# Patient Record
Sex: MALE | Race: OTHER | NOT HISPANIC OR LATINO
[De-identification: names, ages, dates, MRNs, and addresses within clinical notes are randomized per-mention and may not be internally consistent; named-entity substitution may affect disease eponyms.]

---

## 2017-06-28 ENCOUNTER — APPOINTMENT (OUTPATIENT)
Dept: OPHTHALMOLOGY | Facility: CLINIC | Age: 57
End: 2017-06-28

## 2017-06-28 DIAGNOSIS — H50.15 ALTERNATING EXOTROPIA: ICD-10-CM

## 2017-06-29 PROBLEM — H50.15 EXOTROPIA, ALTERNATING: Status: ACTIVE | Noted: 2017-06-29

## 2017-08-21 ENCOUNTER — APPOINTMENT (OUTPATIENT)
Dept: OPHTHALMOLOGY | Facility: CLINIC | Age: 57
End: 2017-08-21

## 2020-04-26 ENCOUNTER — MESSAGE (OUTPATIENT)
Age: 60
End: 2020-04-26

## 2020-06-09 ENCOUNTER — APPOINTMENT (OUTPATIENT)
Dept: HUMAN REPRODUCTION | Facility: CLINIC | Age: 60
End: 2020-06-09

## 2023-02-13 DIAGNOSIS — Z11.52 ENCOUNTER FOR SCREENING FOR COVID-19: ICD-10-CM

## 2023-02-13 PROBLEM — Z00.00 ENCOUNTER FOR PREVENTIVE HEALTH EXAMINATION: Status: ACTIVE | Noted: 2023-02-13

## 2023-02-14 ENCOUNTER — TRANSCRIPTION ENCOUNTER (OUTPATIENT)
Age: 63
End: 2023-02-14

## 2023-02-14 ENCOUNTER — OUTPATIENT (OUTPATIENT)
Dept: OUTPATIENT SERVICES | Facility: HOSPITAL | Age: 63
LOS: 1 days | End: 2023-02-14
Payer: COMMERCIAL

## 2023-02-14 VITALS
SYSTOLIC BLOOD PRESSURE: 117 MMHG | HEART RATE: 64 BPM | OXYGEN SATURATION: 97 % | DIASTOLIC BLOOD PRESSURE: 70 MMHG | RESPIRATION RATE: 15 BRPM

## 2023-02-14 VITALS
SYSTOLIC BLOOD PRESSURE: 148 MMHG | DIASTOLIC BLOOD PRESSURE: 76 MMHG | WEIGHT: 154.98 LBS | OXYGEN SATURATION: 100 % | HEIGHT: 67 IN | TEMPERATURE: 98 F | HEART RATE: 65 BPM | RESPIRATION RATE: 15 BRPM

## 2023-02-14 DIAGNOSIS — R94.09 ABNORMAL RESULTS OF OTHER FUNCTION STUDIES OF CENTRAL NERVOUS SYSTEM: ICD-10-CM

## 2023-02-14 LAB
ALBUMIN SERPL ELPH-MCNC: 5 G/DL — SIGNIFICANT CHANGE UP (ref 3.3–5)
ALP SERPL-CCNC: 73 U/L — SIGNIFICANT CHANGE UP (ref 40–120)
ALT FLD-CCNC: 22 U/L — SIGNIFICANT CHANGE UP (ref 10–45)
ANION GAP SERPL CALC-SCNC: 9 MMOL/L — SIGNIFICANT CHANGE UP (ref 5–17)
AST SERPL-CCNC: 24 U/L — SIGNIFICANT CHANGE UP (ref 10–40)
BILIRUB SERPL-MCNC: 0.5 MG/DL — SIGNIFICANT CHANGE UP (ref 0.2–1.2)
BUN SERPL-MCNC: 16 MG/DL — SIGNIFICANT CHANGE UP (ref 7–23)
CALCIUM SERPL-MCNC: 10 MG/DL — SIGNIFICANT CHANGE UP (ref 8.4–10.5)
CHLORIDE SERPL-SCNC: 99 MMOL/L — SIGNIFICANT CHANGE UP (ref 96–108)
CO2 SERPL-SCNC: 29 MMOL/L — SIGNIFICANT CHANGE UP (ref 22–31)
CREAT SERPL-MCNC: 1.36 MG/DL — HIGH (ref 0.5–1.3)
EGFR: 59 ML/MIN/1.73M2 — LOW
GLUCOSE BLDC GLUCOMTR-MCNC: 136 MG/DL — HIGH (ref 70–99)
GLUCOSE SERPL-MCNC: 138 MG/DL — HIGH (ref 70–99)
HCT VFR BLD CALC: 45.4 % — SIGNIFICANT CHANGE UP (ref 39–50)
HGB BLD-MCNC: 14.4 G/DL — SIGNIFICANT CHANGE UP (ref 13–17)
MCHC RBC-ENTMCNC: 28.3 PG — SIGNIFICANT CHANGE UP (ref 27–34)
MCHC RBC-ENTMCNC: 31.7 GM/DL — LOW (ref 32–36)
MCV RBC AUTO: 89.4 FL — SIGNIFICANT CHANGE UP (ref 80–100)
NRBC # BLD: 0 /100 WBCS — SIGNIFICANT CHANGE UP (ref 0–0)
PLATELET # BLD AUTO: 239 K/UL — SIGNIFICANT CHANGE UP (ref 150–400)
POTASSIUM SERPL-MCNC: 4.6 MMOL/L — SIGNIFICANT CHANGE UP (ref 3.5–5.3)
POTASSIUM SERPL-SCNC: 4.6 MMOL/L — SIGNIFICANT CHANGE UP (ref 3.5–5.3)
PROT SERPL-MCNC: 8 G/DL — SIGNIFICANT CHANGE UP (ref 6–8.3)
RBC # BLD: 5.08 M/UL — SIGNIFICANT CHANGE UP (ref 4.2–5.8)
RBC # FLD: 11.9 % — SIGNIFICANT CHANGE UP (ref 10.3–14.5)
SARS-COV-2 RNA SPEC QL NAA+PROBE: SIGNIFICANT CHANGE UP
SODIUM SERPL-SCNC: 137 MMOL/L — SIGNIFICANT CHANGE UP (ref 135–145)
WBC # BLD: 7.78 K/UL — SIGNIFICANT CHANGE UP (ref 3.8–10.5)
WBC # FLD AUTO: 7.78 K/UL — SIGNIFICANT CHANGE UP (ref 3.8–10.5)

## 2023-02-14 PROCEDURE — 99152 MOD SED SAME PHYS/QHP 5/>YRS: CPT

## 2023-02-14 PROCEDURE — C1753: CPT

## 2023-02-14 PROCEDURE — 36415 COLL VENOUS BLD VENIPUNCTURE: CPT

## 2023-02-14 PROCEDURE — 93010 ELECTROCARDIOGRAM REPORT: CPT

## 2023-02-14 PROCEDURE — 85027 COMPLETE CBC AUTOMATED: CPT

## 2023-02-14 PROCEDURE — C1894: CPT

## 2023-02-14 PROCEDURE — 92928 PRQ TCAT PLMT NTRAC ST 1 LES: CPT | Mod: LD

## 2023-02-14 PROCEDURE — 93454 CORONARY ARTERY ANGIO S&I: CPT | Mod: 26,59

## 2023-02-14 PROCEDURE — U0003: CPT

## 2023-02-14 PROCEDURE — C1769: CPT

## 2023-02-14 PROCEDURE — C1887: CPT

## 2023-02-14 PROCEDURE — C1874: CPT

## 2023-02-14 PROCEDURE — 93454 CORONARY ARTERY ANGIO S&I: CPT | Mod: 59

## 2023-02-14 PROCEDURE — 93005 ELECTROCARDIOGRAM TRACING: CPT

## 2023-02-14 PROCEDURE — 92978 ENDOLUMINL IVUS OCT C 1ST: CPT | Mod: LD

## 2023-02-14 PROCEDURE — C1725: CPT

## 2023-02-14 PROCEDURE — C9600: CPT | Mod: LD

## 2023-02-14 PROCEDURE — 82962 GLUCOSE BLOOD TEST: CPT

## 2023-02-14 PROCEDURE — 80053 COMPREHEN METABOLIC PANEL: CPT

## 2023-02-14 PROCEDURE — 92978 ENDOLUMINL IVUS OCT C 1ST: CPT | Mod: 26,LD

## 2023-02-14 RX ORDER — METOPROLOL TARTRATE 50 MG
1 TABLET ORAL
Qty: 0 | Refills: 0 | DISCHARGE

## 2023-02-14 RX ORDER — CHLORHEXIDINE GLUCONATE 213 G/1000ML
1 SOLUTION TOPICAL ONCE
Refills: 0 | Status: DISCONTINUED | OUTPATIENT
Start: 2023-02-14 | End: 2023-02-28

## 2023-02-14 RX ORDER — SODIUM CHLORIDE 9 MG/ML
3 INJECTION INTRAMUSCULAR; INTRAVENOUS; SUBCUTANEOUS EVERY 8 HOURS
Refills: 0 | Status: DISCONTINUED | OUTPATIENT
Start: 2023-02-14 | End: 2023-02-28

## 2023-02-14 RX ORDER — ASPIRIN/CALCIUM CARB/MAGNESIUM 324 MG
81 TABLET ORAL
Qty: 90 | Refills: 0
Start: 2023-02-14 | End: 2023-03-15

## 2023-02-14 RX ORDER — METFORMIN HYDROCHLORIDE 850 MG/1
1 TABLET ORAL
Qty: 0 | Refills: 0 | DISCHARGE

## 2023-02-14 RX ORDER — SODIUM CHLORIDE 9 MG/ML
1000 INJECTION INTRAMUSCULAR; INTRAVENOUS; SUBCUTANEOUS
Refills: 0 | Status: DISCONTINUED | OUTPATIENT
Start: 2023-02-14 | End: 2023-02-28

## 2023-02-14 RX ORDER — SODIUM CHLORIDE 9 MG/ML
500 INJECTION INTRAMUSCULAR; INTRAVENOUS; SUBCUTANEOUS ONCE
Refills: 0 | Status: COMPLETED | OUTPATIENT
Start: 2023-02-14 | End: 2023-02-14

## 2023-02-14 RX ORDER — CLOPIDOGREL BISULFATE 75 MG/1
1 TABLET, FILM COATED ORAL
Qty: 0 | Refills: 0 | DISCHARGE

## 2023-02-14 RX ORDER — ATORVASTATIN CALCIUM 80 MG/1
1 TABLET, FILM COATED ORAL
Qty: 0 | Refills: 0 | DISCHARGE

## 2023-02-14 RX ADMIN — SODIUM CHLORIDE 1000 MILLILITER(S): 9 INJECTION INTRAMUSCULAR; INTRAVENOUS; SUBCUTANEOUS at 09:20

## 2023-02-14 RX ADMIN — SODIUM CHLORIDE 140 MILLILITER(S): 9 INJECTION INTRAMUSCULAR; INTRAVENOUS; SUBCUTANEOUS at 14:14

## 2023-02-14 NOTE — H&P CARDIOLOGY - NEGATIVE NEUROLOGICAL SYMPTOMS
no weakness/no paresthesias/no focal seizures/no syncope/no tremors/no loss of sensation/no headache/no loss of consciousness/no confusion

## 2023-02-14 NOTE — H&P CARDIOLOGY - HISTORY OF PRESENT ILLNESS
COVID: negative 2/14/23  Cardiologist: Dr. Bryan Contreras  PCP: Dr. Shaik Inessa Ann    62 y/o Male w/ PMHx of HTN, HLD, Type 2 Diabetes presents to the hospital for cardiac catheterization w/ Dr. Contreras. Patient c/o SOB/STOVALL when ambulating 5-6 blocks a/w chest tightness/heaviness. Underwent CCTA which was abnormal (results as below). Started taking Plavix and Toprol 2/13/23. Denies palpitations, abdominal pain, N/V/D, leg pain/edema or any other complaints. In light of pt's risk factors, CCS class III anginal/anginal equivalent symptoms and abnormal CCTA; pt referred to [facility] for cardiac catheterization w/ possible intervention if clinically indicated.    CCTA 2/9/23: Ca score 18 Agatston units (62nd %tile), R dominant coronary artery circulation, intimal hyperplasia w/ 2V territory atherosclerotic plaque LAD 60-70% stenosis, LCx disease not more than 30% COVID: negative 2/14/23  Cardiologist: Dr. Bryan Contreras  PCP: Dr. Shaik Inessa Ann  Pharmacy: [facility] ([facility], [address] [place] [address])    62 y/o Male w/ PMHx of HTN, HLD, Type 2 Diabetes presents to the hospital for cardiac catheterization w/ Dr. Contreras. Patient c/o SOB/STOVALL when ambulating 5-6 blocks a/w chest tightness/heaviness. Underwent CCTA which was abnormal (results as below). Started taking Plavix and Toprol 2/13/23. Denies palpitations, abdominal pain, N/V/D, leg pain/edema or any other complaints. In light of pt's risk factors, CCS class III anginal/anginal equivalent symptoms and abnormal CCTA; pt referred to [facility] for cardiac catheterization w/ possible intervention if clinically indicated.    CCTA 2/9/23: Ca score 18 Agatston units (62nd %tile), R dominant coronary artery circulation, intimal hyperplasia w/ 2V territory atherosclerotic plaque LAD 60-70% stenosis, LCx disease not more than 30%

## 2023-02-14 NOTE — ASU DISCHARGE PLAN (ADULT/PEDIATRIC) - ASU DC SPECIAL INSTRUCTIONSFT
WOUND CARE:  The day after your procedure:  - Remove the bandage at the site gently, clean with a small amount of soap and water, and pat try. Leave open to air.  - You may shower.  - Do not apply lotions, creams, ointments, powder, or perfumes to your incision site.  - Check your wrist or groin daily. A small amount of bruising or soreness is normal.  - Do not soak your site for 1 week (no baths, pools, tubs, etc.).    ACTIVITY:  For the next 24 hours:  - Do not drive or operate heavy machinery.   - Do not make any important decisions or sign legal documents.  - You may resume sexual activity in 48 hours, unless otherwise instructed by your cardiologist.    If your procedure was performed through the groin,  For the next 5 days:  - Limit climbing stairs.  - Do not soak in a bathtub or pool.  - Avoid strenuous activities (pushing, pulling, straining).  - Do not lift anything more than 10 pounds.     MEDICATION:   - Take your medications as explained (see discharge paperwork).  - If you had a stent placed, you will be taking antiplatelet medications to keep your stent open (examples: Aspirin, Plavix, Brilinta, Effient). Take your medications as prescribed. Do not stop taking them without consulting with your cardiologist first.    ADDITIONAL INSTRUCTIONS:  - Follow a heart healthy diet recommended by your doctor.   - If you smoke, we encourage smoking cessation. You may call [phone] for center of tobacco control if you need assistance.  - Call your doctor to make appointment within 2 weeks.    ***CALL YOUR DOCTOR***  If you experience fever, chills, body aches, or severe pain, swelling, redness, heat, or yellow discharge from your incision site.  If you notice bleeding or significant swelling at the procedural site.  If you experience chest pain.  If you experience extremity numbness, tingling, or temperature change.    If you are unable to get in contact with your doctor, you may contact the Cardiology Office at [facility] at [phone]. You may also contact the [facility] at [phone] or [facility] at [phone].

## 2023-02-14 NOTE — ASU DISCHARGE PLAN (ADULT/PEDIATRIC) - CARE PROVIDER_API CALL
Bryan Contreras)  Cardiovascular Disease; Interventional Cardiology  [address] [place] [address]  Phone: [phone]  Fax: [phone]  Established Patient  Follow Up Time: 2 weeks

## 2023-02-14 NOTE — ASU PATIENT PROFILE, ADULT - AS SC BRADEN SENSORY
TRNUG JUSTIN DISCHARGE NOTE    Patient discharged to home at 1:36 PM via wheel chair. Accompanied by daughter and staff. Discharge instructions reviewed with patient and daughter, opportunity offered to ask questions. Prescriptions sent to patients preferred pharmacy. All belongings sent with patient. Patient voided x1, denies N/V. OK for discharge    Vanita Olmedo RN  
(4) no impairment

## 2023-02-17 RX ORDER — METFORMIN HYDROCHLORIDE 850 MG/1
1 TABLET ORAL
Qty: 0 | Refills: 0 | DISCHARGE
Start: 2023-02-17